# Patient Record
Sex: MALE | Race: WHITE | URBAN - METROPOLITAN AREA
[De-identification: names, ages, dates, MRNs, and addresses within clinical notes are randomized per-mention and may not be internally consistent; named-entity substitution may affect disease eponyms.]

---

## 2017-01-05 ENCOUNTER — OFFICE VISIT (OUTPATIENT)
Dept: SURGERY | Facility: CLINIC | Age: 67
End: 2017-01-05
Attending: STUDENT IN AN ORGANIZED HEALTH CARE EDUCATION/TRAINING PROGRAM
Payer: COMMERCIAL

## 2017-01-05 VITALS
RESPIRATION RATE: 16 BRPM | OXYGEN SATURATION: 94 % | TEMPERATURE: 98.1 F | HEART RATE: 68 BPM | BODY MASS INDEX: 34.72 KG/M2 | HEIGHT: 69 IN | DIASTOLIC BLOOD PRESSURE: 71 MMHG | SYSTOLIC BLOOD PRESSURE: 154 MMHG | WEIGHT: 234.4 LBS

## 2017-01-05 DIAGNOSIS — J98.6 DIAPHRAGM PARALYSIS: Primary | ICD-10-CM

## 2017-01-05 DIAGNOSIS — J98.6 PARALYZED HEMIDIAPHRAGM: ICD-10-CM

## 2017-01-05 PROCEDURE — 99212 OFFICE O/P EST SF 10 MIN: CPT | Mod: ZF

## 2017-01-05 RX ORDER — BENZONATATE 200 MG/1
200 CAPSULE ORAL 3 TIMES DAILY PRN
COMMUNITY
Start: 2016-12-16

## 2017-01-05 RX ORDER — PRAMIPEXOLE DIHYDROCHLORIDE 0.12 MG/1
0.12 TABLET ORAL
COMMUNITY
Start: 2016-12-27

## 2017-01-05 ASSESSMENT — PAIN SCALES - GENERAL: PAINLEVEL: NO PAIN (0)

## 2017-01-05 NOTE — Clinical Note
"1/5/2017      RE: David Hall  [id number] [address] [address]       David Hall is a 66 year old male who presents for:  Chief Complaint   Patient presents with     Oncology Clinic Visit     F/u Plication of diaphragm        Initial Vitals:  /71 mmHg  Pulse 68  Temp(Src) 98.1  F (36.7  C) (Oral)  Resp 16  Ht 1.765 m (5' 9.49\")  Wt 106.323 kg (234 lb 6.4 oz)  BMI 34.13 kg/m2  SpO2 94% Estimated body mass index is 34.13 kg/(m^2) as calculated from the following:    Height as of this encounter: 1.765 m (5' 9.49\").    Weight as of this encounter: 106.323 kg (234 lb 6.4 oz).. Body surface area is 2.28 meters squared. BP completed using cuff size: regular  No Pain (0) No LMP for male patient. Allergies and medications reviewed.     Medications: Medication refills not needed today.  Pharmacy name entered into [facility]: [facility] - [address]    Comments:     7 minutes for nursing intake (face to face time)   Mckenzie Cedeño LPN          THORACIC SURGERY FOLLOW UP VISIT    Dear Dr. Fregoso,  I saw Mr. David Hall in follow-up today. The clinical summary follows:     PREOP DIAGNOSIS   Left Diaphragmatic Paralysis    PROCEDURE   Laparoscopic/Thoracospic left diaphragm plication    DATE OF PROCEDURE  11/16/2016    COMPLICATIONS  None    INTERVAL STUDIES  CXR 1/5/17: Decreased left basilar opacity. Stable post operative change of left hemidiaphragm lower than the right      PFTs  Pre op: FEV1: 36% FVC 40%  Post op FEV1: 1.6L (52%)  FVC: 2.19 (52%) ( Standing supine PFTs pending)    ETOH denies  TOB non smoker  BMI 32.3    SUBJECTIVE  Mr. Hall is about 2 months out from the above mentioned procedure. He has seen a major improvement in being able to lay flat and do many more activities without getting short of breath. He has no complaints at this time and his incisions are almost completely healed    From a personal perspective, he is " here with his wife who is also very happy with the result.    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    PLAN    1) Can get back to routine activities as tolerated  2) follow up as needed.  They had all their questions answered and were in agreement with the plan.  I appreciate the opportunity to participate in the care of your patient and will keep you updated.  Sincerely,    Darlyn Junior MD

## 2017-01-05 NOTE — Clinical Note
"1/5/2017       RE: David Hall  [id number] [address] [address]     Dear Colleague,    Thank you for referring your patient, David Hall, to the [facility]. Please see a copy of my visit note below.    David Hall is a 66 year old male who presents for:  Chief Complaint   Patient presents with     Oncology Clinic Visit     F/u Plication of diaphragm        Initial Vitals:  /71 mmHg  Pulse 68  Temp(Src) 98.1  F (36.7  C) (Oral)  Resp 16  Ht 1.765 m (5' 9.49\")  Wt 106.323 kg (234 lb 6.4 oz)  BMI 34.13 kg/m2  SpO2 94% Estimated body mass index is 34.13 kg/(m^2) as calculated from the following:    Height as of this encounter: 1.765 m (5' 9.49\").    Weight as of this encounter: 106.323 kg (234 lb 6.4 oz).. Body surface area is 2.28 meters squared. BP completed using cuff size: regular  No Pain (0) No LMP for male patient. Allergies and medications reviewed.     Medications: Medication refills not needed today.  Pharmacy name entered into [facility]: [facility] - [address]    Comments:     7 minutes for nursing intake (face to face time)   Mckenzie Cedeño LPN          THORACIC SURGERY FOLLOW UP VISIT    Dear Dr. Fregoso,  I saw Mr. David Hall in follow-up today. The clinical summary follows:     PREOP DIAGNOSIS   Left Diaphragmatic Paralysis    PROCEDURE   Laparoscopic/Thoracospic left diaphragm plication    DATE OF PROCEDURE  11/16/2016    COMPLICATIONS  None    INTERVAL STUDIES  CXR 1/5/17: Decreased left basilar opacity. Stable post operative change of left hemidiaphragm lower than the right      PFTs  Pre op: FEV1: 36% FVC 40%  Post op FEV1: 1.6L (52%)  FVC: 2.19 (52%) ( Standing supine PFTs pending)    ETOH denies  TOB non smoker  BMI 32.3    SUBJECTIVE  Mr. Hall is about 2 months out from the above mentioned procedure. He has seen a major improvement in being able to lay flat and do many more " activities without getting short of breath. He has no complaints at this time and his incisions are almost completely healed    From a personal perspective, he is here with his wife who is also very happy with the result.    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    PLAN    1) Can get back to routine activities as tolerated  2) follow up as needed.  They had all their questions answered and were in agreement with the plan.  I appreciate the opportunity to participate in the care of your patient and will keep you updated.  Sincerely,        Darlyn Junior MD

## 2017-01-18 LAB
DLCOUNC-%PRED-PRE: 99 %
DLCOUNC-PRE: 26.52 ML/MIN/MMHG
DLCOUNC-PRED: 26.54 ML/MIN/MMHG
ERV-%PRED-PRE: 32 %
ERV-PRE: 0.19 L
ERV-PRED: 0.59 L
EXPTIME-PRE: 7.98 SEC
FEF2575-%PRED-POST: 12 %
FEF2575-%PRED-PRE: 49 %
FEF2575-POST: 0.31 L/SEC
FEF2575-PRE: 1.29 L/SEC
FEF2575-PRED: 2.6 L/SEC
FEFMAX-%PRED-PRE: 75 %
FEFMAX-PRE: 6.33 L/SEC
FEFMAX-PRED: 8.41 L/SEC
FEV1-%PRED-PRE: 52 %
FEV1-PRE: 1.66 L
FEV1FEV6-PRE: 76 %
FEV1FEV6-PRED: 78 %
FEV1FVC-PRE: 76 %
FEV1FVC-PRED: 77 %
FEV1SVC-PRE: 71 %
FEV1SVC-PRED: 70 %
FIFMAX-PRE: 4.91 L/SEC
FRCPLETH-%PRED-PRE: 60 %
FRCPLETH-PRE: 2.14 L
FRCPLETH-PRED: 3.53 L
FVC-%PRED-PRE: 52 %
FVC-PRE: 2.19 L
FVC-PRED: 4.16 L
IC-%PRED-PRE: 54 %
IC-PRE: 2.16 L
IC-PRED: 3.95 L
RVPLETH-%PRED-PRE: 79 %
RVPLETH-PRE: 1.95 L
RVPLETH-PRED: 2.45 L
TLCPLETH-%PRED-PRE: 63 %
TLCPLETH-PRE: 4.3 L
TLCPLETH-PRED: 6.72 L
VA-%PRED-PRE: 56 %
VA-PRE: 3.61 L
VC-%PRED-PRE: 51 %
VC-PRE: 2.35 L
VC-PRED: 4.54 L